# Patient Record
Sex: MALE | Race: BLACK OR AFRICAN AMERICAN | NOT HISPANIC OR LATINO | Employment: UNEMPLOYED | URBAN - METROPOLITAN AREA
[De-identification: names, ages, dates, MRNs, and addresses within clinical notes are randomized per-mention and may not be internally consistent; named-entity substitution may affect disease eponyms.]

---

## 2023-05-24 PROBLEM — R31.9 HEMATURIA: Status: ACTIVE | Noted: 2023-05-24

## 2023-05-24 PROBLEM — E55.9 VITAMIN D DEFICIENCY: Status: ACTIVE | Noted: 2023-05-24

## 2023-05-24 PROBLEM — H57.89 REDNESS OF EYE, LEFT: Status: ACTIVE | Noted: 2023-05-24

## 2023-05-24 PROBLEM — M54.16 ACUTE RADICULAR LOW BACK PAIN: Status: ACTIVE | Noted: 2023-05-24

## 2023-05-24 PROBLEM — E78.5 ELEVATED LIPIDS: Status: ACTIVE | Noted: 2023-05-24

## 2023-05-24 PROBLEM — R68.82 LIBIDO, DECREASED: Status: ACTIVE | Noted: 2023-05-24

## 2023-05-24 PROBLEM — R73.03 PREDIABETES: Status: ACTIVE | Noted: 2023-05-24

## 2023-05-24 PROBLEM — I10 HYPERTENSION: Status: ACTIVE | Noted: 2023-05-24

## 2023-05-24 PROBLEM — R53.83 FATIGUE: Status: ACTIVE | Noted: 2023-05-24

## 2023-05-24 PROBLEM — G47.33 OSA (OBSTRUCTIVE SLEEP APNEA): Status: ACTIVE | Noted: 2023-05-24

## 2023-05-24 RX ORDER — CYCLOBENZAPRINE HCL 10 MG
1 TABLET ORAL NIGHTLY
COMMUNITY
Start: 2022-09-09

## 2023-05-24 RX ORDER — AMLODIPINE BESYLATE 5 MG/1
1 TABLET ORAL DAILY
COMMUNITY
Start: 2020-01-09 | End: 2023-06-09 | Stop reason: ALTCHOICE

## 2023-05-24 RX ORDER — TOBRAMYCIN 3 MG/ML
SOLUTION/ DROPS OPHTHALMIC
COMMUNITY
Start: 2022-09-09 | End: 2023-06-09 | Stop reason: ALTCHOICE

## 2023-05-24 RX ORDER — CHOLECALCIFEROL (VITAMIN D3) 50 MCG
1 TABLET ORAL DAILY
COMMUNITY
Start: 2019-10-11 | End: 2023-06-09 | Stop reason: SDUPTHER

## 2023-05-24 RX ORDER — GABAPENTIN 100 MG/1
CAPSULE ORAL
COMMUNITY
Start: 2021-03-03 | End: 2023-06-09 | Stop reason: ALTCHOICE

## 2023-06-09 ENCOUNTER — OFFICE VISIT (OUTPATIENT)
Dept: PRIMARY CARE | Facility: CLINIC | Age: 43
End: 2023-06-09
Payer: COMMERCIAL

## 2023-06-09 VITALS
SYSTOLIC BLOOD PRESSURE: 128 MMHG | OXYGEN SATURATION: 98 % | HEART RATE: 57 BPM | RESPIRATION RATE: 16 BRPM | WEIGHT: 196 LBS | HEIGHT: 72 IN | DIASTOLIC BLOOD PRESSURE: 77 MMHG | TEMPERATURE: 96.4 F | BODY MASS INDEX: 26.55 KG/M2

## 2023-06-09 DIAGNOSIS — E78.5 ELEVATED LIPIDS: ICD-10-CM

## 2023-06-09 DIAGNOSIS — I10 HYPERTENSION, UNSPECIFIED TYPE: Primary | ICD-10-CM

## 2023-06-09 DIAGNOSIS — E55.9 VITAMIN D DEFICIENCY: ICD-10-CM

## 2023-06-09 DIAGNOSIS — R73.03 PREDIABETES: ICD-10-CM

## 2023-06-09 PROBLEM — R53.83 FATIGUE: Status: RESOLVED | Noted: 2023-05-24 | Resolved: 2023-06-09

## 2023-06-09 PROBLEM — R31.9 HEMATURIA: Status: RESOLVED | Noted: 2023-05-24 | Resolved: 2023-06-09

## 2023-06-09 PROBLEM — M54.16 ACUTE RADICULAR LOW BACK PAIN: Status: RESOLVED | Noted: 2023-05-24 | Resolved: 2023-06-09

## 2023-06-09 PROBLEM — H57.89 REDNESS OF EYE, LEFT: Status: RESOLVED | Noted: 2023-05-24 | Resolved: 2023-06-09

## 2023-06-09 PROCEDURE — 1036F TOBACCO NON-USER: CPT | Performed by: PEDIATRICS

## 2023-06-09 PROCEDURE — 3078F DIAST BP <80 MM HG: CPT | Performed by: PEDIATRICS

## 2023-06-09 PROCEDURE — 99213 OFFICE O/P EST LOW 20 MIN: CPT | Performed by: PEDIATRICS

## 2023-06-09 PROCEDURE — 3074F SYST BP LT 130 MM HG: CPT | Performed by: PEDIATRICS

## 2023-06-09 RX ORDER — CHOLECALCIFEROL (VITAMIN D3) 50 MCG
50 TABLET ORAL DAILY
Qty: 90 TABLET | Refills: 3 | Status: SHIPPED | OUTPATIENT
Start: 2023-06-09

## 2023-06-09 RX ORDER — IBUPROFEN 800 MG/1
TABLET ORAL
COMMUNITY
Start: 2023-05-10 | End: 2023-06-09 | Stop reason: ALTCHOICE

## 2023-06-09 RX ORDER — AMLODIPINE BESYLATE 10 MG/1
TABLET ORAL
COMMUNITY
Start: 2023-05-10

## 2023-06-09 ASSESSMENT — PATIENT HEALTH QUESTIONNAIRE - PHQ9
1. LITTLE INTEREST OR PLEASURE IN DOING THINGS: NOT AT ALL
SUM OF ALL RESPONSES TO PHQ9 QUESTIONS 1 AND 2: 0
2. FEELING DOWN, DEPRESSED OR HOPELESS: NOT AT ALL

## 2023-06-09 ASSESSMENT — PAIN SCALES - GENERAL: PAINLEVEL: 0-NO PAIN

## 2023-06-09 NOTE — PROGRESS NOTES
Subjective   Patient ID: Malik Francis is a 42 y.o. male who presents for Hypertension and Prediabetes.    HPI   Patient occasionally has flank pain with heavy lifting;  Last UA showed no blood;  Patient had normal PSA in December  Last tetanus shot was 2015.  Review of Systems    Objective   /77 (BP Location: Left arm, Patient Position: Sitting, BP Cuff Size: Large adult)   Pulse 57   Temp 35.8 °C (96.4 °F) (Temporal)   Resp 16   Ht 1.829 m (6')   Wt 88.9 kg (196 lb)   SpO2 98%   BMI 26.58 kg/m²     Physical Exam  HEENT neg  Lungs clear  Heart reg  Abd soft  SLR neg  Assessment/Plan   Problem List Items Addressed This Visit          Circulatory    Hypertension - Primary     Well controlled on Amlodipine 10            Endocrine/Metabolic    Prediabetes     Last sugar 89         Vitamin D deficiency    Relevant Medications    cholecalciferol (Vitamin D-3) 50 MCG (2000 UT) tablet       Other    Elevated lipids     Last cholesterol 195

## 2023-12-08 ENCOUNTER — APPOINTMENT (OUTPATIENT)
Dept: PRIMARY CARE | Facility: CLINIC | Age: 43
End: 2023-12-08
Payer: COMMERCIAL

## 2024-07-19 ENCOUNTER — HOSPITAL ENCOUNTER (OUTPATIENT)
Dept: RADIOLOGY | Facility: HOSPITAL | Age: 44
Discharge: HOME | End: 2024-07-19
Payer: COMMERCIAL

## 2024-07-19 ENCOUNTER — APPOINTMENT (OUTPATIENT)
Dept: PRIMARY CARE | Facility: CLINIC | Age: 44
End: 2024-07-19
Payer: COMMERCIAL

## 2024-07-19 VITALS
TEMPERATURE: 98 F | HEART RATE: 50 BPM | SYSTOLIC BLOOD PRESSURE: 136 MMHG | OXYGEN SATURATION: 98 % | HEIGHT: 72 IN | BODY MASS INDEX: 25.71 KG/M2 | WEIGHT: 189.8 LBS | DIASTOLIC BLOOD PRESSURE: 76 MMHG

## 2024-07-19 DIAGNOSIS — R10.32 LEFT LOWER QUADRANT ABDOMINAL PAIN: ICD-10-CM

## 2024-07-19 DIAGNOSIS — Z20.2 EXPOSURE TO STD: ICD-10-CM

## 2024-07-19 DIAGNOSIS — R82.90 CLOUDY URINE: ICD-10-CM

## 2024-07-19 DIAGNOSIS — R73.03 PREDIABETES: ICD-10-CM

## 2024-07-19 DIAGNOSIS — E78.5 ELEVATED LIPIDS: ICD-10-CM

## 2024-07-19 DIAGNOSIS — E55.9 VITAMIN D DEFICIENCY: ICD-10-CM

## 2024-07-19 DIAGNOSIS — I10 HYPERTENSION, UNSPECIFIED TYPE: Primary | ICD-10-CM

## 2024-07-19 DIAGNOSIS — Z12.11 COLON CANCER SCREENING: ICD-10-CM

## 2024-07-19 PROBLEM — G47.33 OSA (OBSTRUCTIVE SLEEP APNEA): Status: RESOLVED | Noted: 2023-05-24 | Resolved: 2024-07-19

## 2024-07-19 LAB
APPEARANCE UR: ABNORMAL
BILIRUB UR QL STRIP: NEGATIVE
COLOR UR: ABNORMAL
GLUCOSE UR STRIP-MCNC: NEGATIVE MG/DL
HCV AB SER QL: NONREACTIVE
HGB UR QL STRIP: NEGATIVE
HIV 1+2 AB+HIV1 P24 AG SERPL QL IA: NONREACTIVE
KETONES UR STRIP-MCNC: NEGATIVE MG/DL
LEUKOCYTE ESTERASE UR QL STRIP: NEGATIVE
NITRITE UR QL STRIP: NEGATIVE
PH UR STRIP: 7 [PH]
PROT UR STRIP-MCNC: NEGATIVE MG/DL
SP GR UR STRIP.AUTO: 1.02
TREPONEMA PALLIDUM IGG+IGM AB [PRESENCE] IN SERUM OR PLASMA BY IMMUNOASSAY: NONREACTIVE
UROBILINOGEN UR STRIP-ACNC: 1 E.U./DL

## 2024-07-19 PROCEDURE — 87389 HIV-1 AG W/HIV-1&-2 AB AG IA: CPT

## 2024-07-19 PROCEDURE — 3075F SYST BP GE 130 - 139MM HG: CPT | Performed by: PEDIATRICS

## 2024-07-19 PROCEDURE — 3008F BODY MASS INDEX DOCD: CPT | Performed by: PEDIATRICS

## 2024-07-19 PROCEDURE — 86780 TREPONEMA PALLIDUM: CPT

## 2024-07-19 PROCEDURE — 87591 N.GONORRHOEAE DNA AMP PROB: CPT

## 2024-07-19 PROCEDURE — 1036F TOBACCO NON-USER: CPT | Performed by: PEDIATRICS

## 2024-07-19 PROCEDURE — 36415 COLL VENOUS BLD VENIPUNCTURE: CPT

## 2024-07-19 PROCEDURE — 86803 HEPATITIS C AB TEST: CPT

## 2024-07-19 PROCEDURE — 3078F DIAST BP <80 MM HG: CPT | Performed by: PEDIATRICS

## 2024-07-19 PROCEDURE — 74176 CT ABD & PELVIS W/O CONTRAST: CPT

## 2024-07-19 PROCEDURE — 87491 CHLMYD TRACH DNA AMP PROBE: CPT

## 2024-07-19 RX ORDER — AMLODIPINE BESYLATE 5 MG/1
5 TABLET ORAL DAILY
Qty: 90 TABLET | Refills: 0 | Status: SHIPPED | OUTPATIENT
Start: 2024-07-19 | End: 2025-01-15

## 2024-07-19 ASSESSMENT — ENCOUNTER SYMPTOMS
DIZZINESS: 0
SHORTNESS OF BREATH: 0
ABDOMINAL PAIN: 1
CHILLS: 0
DYSURIA: 0
FEVER: 0
HEADACHES: 0

## 2024-07-19 NOTE — PROGRESS NOTES
Subjective   Patient ID: Malik Francis is a 44 y.o. male who presents for yearly visit    HPI   Here today for annual physical.  Stopped taking amlodipine 10mg in months due to running out.     Has cramping/sharp abdominal pain in the LLQ that radiates down to groin area. States pain has been going on for years but has worsened over the past couple of months.  The pain comes and goes and when it is painful he rates the pain 10/10. Does not take anything for the pain.  Has a hernia repair in the same area in 2010 which is thinks could be related. Not constipated; No bulge in groin.    Diet: Eating out frequently and eating more processed foods. Likes vegetables, drinks a lot of water.    Alcohol: was previously drinking daily, 1/2 pint of liquor daily, has not had a drink in 3 weeks   Smokes marijuana daily   No illicit drug use     Colonoscopy - never done,PSA: last year - normal     Due for annual labs   Colonoscopy order   Tdap 2016    Review of Systems   Constitutional:  Negative for chills and fever.   Respiratory:  Negative for shortness of breath.    Cardiovascular:  Negative for chest pain.   Gastrointestinal:  Positive for abdominal pain.   Genitourinary:  Negative for dysuria.   Neurological:  Negative for dizziness and headaches.     Back hurts every day; sometimes heavy lifting at work; lifts from the legs  Objective   /76   Pulse 50   Temp 36.7 °C (98 °F)   Ht 1.829 m (6')   Wt 86.1 kg (189 lb 12.8 oz)   SpO2 98%   BMI 25.74 kg/m²     Physical Exam  Vitals reviewed.   Constitutional:       General: He is not in acute distress.  HENT:      Head: Normocephalic.      Right Ear: Tympanic membrane normal.      Left Ear: Tympanic membrane normal.      Nose: Nose normal.      Mouth/Throat:      Pharynx: Oropharynx is clear.   Cardiovascular:      Rate and Rhythm: Normal rate and regular rhythm.      Heart sounds: Normal heart sounds.   Pulmonary:      Breath sounds: Normal breath sounds.    Abdominal:      Palpations: Abdomen is soft.      Tenderness: There is abdominal tenderness.      Comments: LLQ tender with guarding   Genitourinary:     Comments: No hernia  Musculoskeletal:         General: No tenderness.   Skin:     Findings: No rash.   Neurological:      General: No focal deficit present.      Mental Status: He is alert.   Psychiatric:         Mood and Affect: Mood normal.         Assessment/Plan   Problem List Items Addressed This Visit             ICD-10-CM    Elevated lipids E78.5      in 2021  Will recheck today         Relevant Orders    Comprehensive Metabolic Panel    Lipid Panel    Hypertension - Primary I10     Has not been on bp medication, amplodipine for months due to running out.   Does not take bp at home         Relevant Medications    amLODIPine (Norvasc) 5 mg tablet    Prediabetes R73.03    Relevant Orders    Hemoglobin A1C    Vitamin D deficiency E55.9     Does not take vitamin D supplements   Will recheck today         Left lower quadrant abdominal pain R10.32    Relevant Orders    CT abdomen pelvis w and wo IV contrast     Other Visit Diagnoses         Codes    Colon cancer screening     Z12.11    Relevant Orders    Cologuard® colon cancer screening    Exposure to STD     Z20.2    Relevant Orders    HIV 1/2 Antigen/Antibody Screen with Reflex to Confirmation    Hepatitis C antibody    Syphilis Screen with Reflex    C. trachomatis + N. gonorrhoeae, Amplified

## 2024-07-19 NOTE — ASSESSMENT & PLAN NOTE
Has not been on bp medication, amplodipine for months due to running out.   Does not take bp at home

## 2024-07-20 LAB
C TRACH RRNA SPEC QL NAA+PROBE: NEGATIVE
N GONORRHOEA DNA SPEC QL PROBE+SIG AMP: NEGATIVE

## 2024-07-21 PROBLEM — H02.88A MEIBOMIAN GLAND DYSFUNCTION (MGD) OF UPPER AND LOWER EYELID OF RIGHT EYE: Status: ACTIVE | Noted: 2022-11-17

## 2024-07-23 DIAGNOSIS — R10.32 LEFT LOWER QUADRANT ABDOMINAL PAIN: Primary | ICD-10-CM

## 2024-07-23 DIAGNOSIS — Z12.5 SCREENING PSA (PROSTATE SPECIFIC ANTIGEN): ICD-10-CM

## 2024-07-24 ENCOUNTER — TELEPHONE (OUTPATIENT)
Dept: GASTROENTEROLOGY | Facility: CLINIC | Age: 44
End: 2024-07-24
Payer: COMMERCIAL

## 2024-07-24 NOTE — TELEPHONE ENCOUNTER
Good morning, I just called the pt and left him a message. The soonest that we have available is 9/17. Thank you!!          Copied from CRM #[id number]. Topic: Needs Earlier Appointment  >> Jul 24, 2024 10:50 AM Chloé CHICAS wrote:  [employer] Message created and sent to department pool. has concerns about  and she is asking that he is seen by Wu Hernandez I was not able to offer him anything sooner than Oct asking for assist for a care and treatment plan for his Left lower quadrant abdominal pain [R10.32]

## 2024-10-30 ENCOUNTER — APPOINTMENT (OUTPATIENT)
Dept: GASTROENTEROLOGY | Facility: CLINIC | Age: 44
End: 2024-10-30
Payer: COMMERCIAL

## 2025-02-03 ENCOUNTER — PATIENT OUTREACH (OUTPATIENT)
Dept: CARE COORDINATION | Facility: CLINIC | Age: 45
End: 2025-02-03
Payer: COMMERCIAL

## 2025-02-03 NOTE — PROGRESS NOTES
Outreach call to patient to support a smooth transition of care from recent admission.  Spoke with patient, reviewed discharge medications, discharge instructions, assessed social needs, and provided education on importance of follow-up appointment with provider.  Will continue to monitor through transition period.Patient was discharged from [facility] . Offered to make post hosptila follow up with Kathy Thornton MD patient wanted to make himself. Patient has all his medications at home and understands plan of care.       Assessment & Plan  ANA PAULA (acute kidney injury) (HCC)  Neph consult   Avoid nephrotoxic meds   Abdominal pain  Ct abd unremarkable   Essential hypertension  Add Norvasc         Thank you,   Yusra North , RN   Nurse Care Manager   [facility] Department   [phone]

## 2025-02-18 ENCOUNTER — PATIENT OUTREACH (OUTPATIENT)
Dept: CARE COORDINATION | Facility: CLINIC | Age: 45
End: 2025-02-18
Payer: COMMERCIAL

## 2025-02-18 NOTE — PROGRESS NOTES
Outreach call to patient to support a smooth transition of care from recent admission.  Patient has not followed up with PCP yet.     Yusra North , RN   Nurse Care Manager   [facility] Department   [phone]

## 2025-03-04 ENCOUNTER — PATIENT OUTREACH (OUTPATIENT)
Dept: CARE COORDINATION | Facility: CLINIC | Age: 45
End: 2025-03-04
Payer: COMMERCIAL

## 2025-03-04 NOTE — PROGRESS NOTES
Outreach call to patient to check in 30 days after hospital discharge to support smooth transition of care.  Patient with no additional needs noted. No additional outreach needed at this time. Reminded him to follow up with Kathy Thornton MD     Thank you,   Yusra North , RN   Nurse Care Manager   [facility]   [phone]

## 2025-05-09 ENCOUNTER — OFFICE VISIT (OUTPATIENT)
Dept: URGENT CARE | Age: 45
End: 2025-05-09
Payer: COMMERCIAL

## 2025-05-09 VITALS
BODY MASS INDEX: 25.06 KG/M2 | HEIGHT: 72 IN | RESPIRATION RATE: 18 BRPM | TEMPERATURE: 98 F | SYSTOLIC BLOOD PRESSURE: 156 MMHG | DIASTOLIC BLOOD PRESSURE: 99 MMHG | OXYGEN SATURATION: 97 % | HEART RATE: 61 BPM | WEIGHT: 185 LBS

## 2025-05-09 DIAGNOSIS — Z02.0 SCHOOL PHYSICAL EXAM: ICD-10-CM

## 2025-05-09 DIAGNOSIS — I10 PRIMARY HYPERTENSION: Primary | ICD-10-CM

## 2025-05-09 PROCEDURE — 1036F TOBACCO NON-USER: CPT | Performed by: PHYSICIAN ASSISTANT

## 2025-05-09 PROCEDURE — 3077F SYST BP >= 140 MM HG: CPT | Performed by: PHYSICIAN ASSISTANT

## 2025-05-09 PROCEDURE — 3080F DIAST BP >= 90 MM HG: CPT | Performed by: PHYSICIAN ASSISTANT

## 2025-05-09 PROCEDURE — 3008F BODY MASS INDEX DOCD: CPT | Performed by: PHYSICIAN ASSISTANT

## 2025-05-09 PROCEDURE — INPHY INTER PHYSICAL: Performed by: PHYSICIAN ASSISTANT

## 2025-05-09 RX ORDER — AMLODIPINE BESYLATE 5 MG/1
5 TABLET ORAL DAILY
Qty: 30 TABLET | Refills: 0 | Status: SHIPPED | OUTPATIENT
Start: 2025-05-09 | End: 2026-05-09

## 2025-07-07 ENCOUNTER — HOSPITAL ENCOUNTER (EMERGENCY)
Facility: HOSPITAL | Age: 45
Discharge: HOME | End: 2025-07-07
Attending: EMERGENCY MEDICINE
Payer: COMMERCIAL

## 2025-07-07 VITALS
OXYGEN SATURATION: 95 % | DIASTOLIC BLOOD PRESSURE: 92 MMHG | SYSTOLIC BLOOD PRESSURE: 153 MMHG | HEART RATE: 87 BPM | TEMPERATURE: 98.5 F | RESPIRATION RATE: 16 BRPM

## 2025-07-07 DIAGNOSIS — K40.90 UNILATERAL INGUINAL HERNIA WITHOUT OBSTRUCTION OR GANGRENE, RECURRENCE NOT SPECIFIED: ICD-10-CM

## 2025-07-07 DIAGNOSIS — N41.0 ACUTE PROSTATITIS: Primary | ICD-10-CM

## 2025-07-07 LAB — HIV 1+2 AB+HIV1 P24 AG SERPL QL IA: NONREACTIVE

## 2025-07-07 PROCEDURE — 87389 HIV-1 AG W/HIV-1&-2 AB AG IA: CPT | Performed by: EMERGENCY MEDICINE

## 2025-07-07 PROCEDURE — 36415 COLL VENOUS BLD VENIPUNCTURE: CPT

## 2025-07-07 PROCEDURE — 87491 CHLMYD TRACH DNA AMP PROBE: CPT

## 2025-07-07 PROCEDURE — 87340 HEPATITIS B SURFACE AG IA: CPT | Performed by: EMERGENCY MEDICINE

## 2025-07-07 PROCEDURE — 96372 THER/PROPH/DIAG INJ SC/IM: CPT

## 2025-07-07 PROCEDURE — 2500000001 HC RX 250 WO HCPCS SELF ADMINISTERED DRUGS (ALT 637 FOR MEDICARE OP)

## 2025-07-07 PROCEDURE — 36415 COLL VENOUS BLD VENIPUNCTURE: CPT | Performed by: EMERGENCY MEDICINE

## 2025-07-07 PROCEDURE — 99284 EMERGENCY DEPT VISIT MOD MDM: CPT | Performed by: EMERGENCY MEDICINE

## 2025-07-07 PROCEDURE — 86780 TREPONEMA PALLIDUM: CPT | Performed by: EMERGENCY MEDICINE

## 2025-07-07 PROCEDURE — 2500000004 HC RX 250 GENERAL PHARMACY W/ HCPCS (ALT 636 FOR OP/ED)

## 2025-07-07 RX ORDER — IBUPROFEN 600 MG/1
600 TABLET, FILM COATED ORAL EVERY 6 HOURS PRN
Qty: 28 TABLET | Refills: 0 | Status: SHIPPED | OUTPATIENT
Start: 2025-07-07 | End: 2025-07-14

## 2025-07-07 RX ORDER — DOXYCYCLINE 100 MG/1
100 TABLET ORAL 2 TIMES DAILY
Qty: 28 TABLET | Refills: 0 | Status: SHIPPED | OUTPATIENT
Start: 2025-07-07 | End: 2025-07-21

## 2025-07-07 RX ORDER — ACETAMINOPHEN 325 MG/1
975 TABLET ORAL ONCE
Status: COMPLETED | OUTPATIENT
Start: 2025-07-07 | End: 2025-07-07

## 2025-07-07 RX ORDER — ACETAMINOPHEN 325 MG/1
650 TABLET ORAL EVERY 6 HOURS PRN
Qty: 30 TABLET | Refills: 0 | Status: SHIPPED | OUTPATIENT
Start: 2025-07-07

## 2025-07-07 RX ORDER — TAMSULOSIN HYDROCHLORIDE 0.4 MG/1
0.4 CAPSULE ORAL DAILY
Qty: 5 CAPSULE | Refills: 0 | Status: SHIPPED | OUTPATIENT
Start: 2025-07-07

## 2025-07-07 RX ORDER — DOXYCYCLINE HYCLATE 100 MG
100 TABLET ORAL ONCE
Status: COMPLETED | OUTPATIENT
Start: 2025-07-07 | End: 2025-07-07

## 2025-07-07 RX ORDER — CEFTRIAXONE 1 G/1
1 INJECTION, POWDER, FOR SOLUTION INTRAMUSCULAR; INTRAVENOUS ONCE
Status: COMPLETED | OUTPATIENT
Start: 2025-07-07 | End: 2025-07-07

## 2025-07-07 RX ORDER — CEFTRIAXONE 1 G/50ML
1 INJECTION, SOLUTION INTRAVENOUS ONCE
Status: DISCONTINUED | OUTPATIENT
Start: 2025-07-07 | End: 2025-07-07

## 2025-07-07 RX ADMIN — ACETAMINOPHEN 975 MG: 325 TABLET ORAL at 21:20

## 2025-07-07 RX ADMIN — CEFTRIAXONE SODIUM 1 G: 1 INJECTION, POWDER, FOR SOLUTION INTRAMUSCULAR; INTRAVENOUS at 21:20

## 2025-07-07 RX ADMIN — DOXYCYCLINE HYCLATE 100 MG: 100 TABLET, COATED ORAL at 21:20

## 2025-07-07 ASSESSMENT — PAIN - FUNCTIONAL ASSESSMENT: PAIN_FUNCTIONAL_ASSESSMENT: 0-10

## 2025-07-07 ASSESSMENT — PAIN SCALES - GENERAL: PAINLEVEL_OUTOF10: 6

## 2025-07-07 NOTE — Clinical Note
Malik Francis was seen and treated in our emergency department on 7/7/2025.  He may return to work on 07/10/2025.       If you have any questions or concerns, please don't hesitate to call.      Samir Mclaughlin MD

## 2025-07-07 NOTE — ED TRIAGE NOTES
R pelvic/testicle swelling. States it has been going on for about 4 weeks. Worsens with straining. Hx of hernia repair on the L side  
Home

## 2025-07-08 LAB
C TRACH RRNA SPEC QL NAA+PROBE: NEGATIVE
HBV SURFACE AG SERPL QL IA: NONREACTIVE
N GONORRHOEA DNA SPEC QL PROBE+SIG AMP: NEGATIVE
TREPONEMA PALLIDUM IGG+IGM AB [PRESENCE] IN SERUM OR PLASMA BY IMMUNOASSAY: NONREACTIVE

## 2025-07-08 NOTE — DISCHARGE INSTRUCTIONS
You have been evaluated in the Emergency Department today for groin pain and swelling. Your evaluation, including thorough exam and, suggests that your symptoms are due to prostatitis.  We have sent prescriptions for antibiotics which you will take for 14 days.  Please take full full course even if you feel better.  We have also given you prescription for Tylenol and ibuprofen as well as Flomax to help your urinary stream.    Please follow up with your primary care physician within two days. If you do not have a primary care doctor you may call [phone] to make an appointment.    Return to the Emergency Department if you experience lightheadedness, worsening pain or symptoms. Return to the Emergency Department if you develop any new or worsening symptoms.   Thank you for choosing us for your care.

## 2025-07-08 NOTE — ED PROVIDER NOTES
History of Present Illness     History provided by: Patient  Limitations to History: None  External Records Reviewed: Discharge summaries    HPI:  Malik Francis is a 45 y.o. male with past medical history pertinent for hypertension, bradycardia, repaired left inguinal hernia presents to the emergency department for evaluation in the setting of groin swelling.  Patient states right groin and testicular swelling has been going on for about 4 weeks that worsens with straining.  Patient states urinary hesitancy and frequency over the same time period.  Patient states unprotected vaginal intercourse.  Patient denies fevers or chills.  Patient states back pain.  Patient denies nausea/vomiting.  Patient denies hematochezia or melena.  Patient denies obstipation or constipation.    Physical Exam   Triage vitals:  T 36.9 °C (98.5 °F)  HR 87  BP (!) 153/92  RR 16  O2 95 % None (Room air)    Patient is awake, alert, oriented, no acute distress resting comfortably on hospital chair  He speaks in full sentences with no respiratory distress  He has 2+ radial pulses bilaterally with no delayed capillary refill, his pulses strong and regular  He moves all 4 extremities without deficit or difficulty, he ambulates without difficulty  Patient's abdominal exam is reassuring with no guarding, rebound, peritonitic signs  He has no CVA tenderness, he does have suprapubic tenderness, he has a left inguinal scar without obvious hernia, no swelling, no inguinal lymphadenopathy, no right inguinal swelling, his scrotum is not erythematous, there are no penile or scrotal lesions, there is no fluctuance or masses appreciated in his scrotum, his testes are present bilaterally in his scrotum without pain on manipulation  A chaperoned rectal exam reveals exquisite prostate tenderness without nodularity, no blood on DAYANARA    Medical Decision Making & ED Course   Medical Decision Makin y.o. male hypertensive with a history of same otherwise  hemodynamically stable presents to the emergency department for groin swelling.  Patient's presentation is consistent with an episode of bacterial prostatitis.  Patient will be treated empirically with Rocephin and doxycycline as well as a prescription for the same.  He was given Flomax and Tylenol for pain control.  No concerns about hernia in this patient.  Patient's presentation is not consistent with intermittent torsion and detorsion.  No concerns about serious bacterial illness causing systemic symptoms.  Will obtain urine and STI testing.  Patient was discharged with results pending.  Patient was given referral to primary care.  ----         Social Determinants of Health which Significantly Impact Care: None identified       Chronic conditions affecting the patient's care: See HPI    The patient was discussed with the following consultants/services: Please see ED course for consult transcript        ED Course:  Diagnoses as of 07/07/25 2050   Acute prostatitis     Disposition   As a result of the work-up, the patient was discharged home.  he was informed of his diagnosis and instructed to come back with any concerns or worsening of condition.  he and was agreeable to the plan as discussed above.  he was given the opportunity to ask questions.  All of the patient's questions were answered.    Procedures   Procedures    Patient was seen and discussed with the attending of record.    Samir Mclaughlin MD  Emergency Medicine     Samir Mclaughlin MD  Resident  07/07/25 2053       Samir Mclaughlin MD  Resident  07/07/25 2057

## 2025-07-23 ENCOUNTER — APPOINTMENT (OUTPATIENT)
Dept: PRIMARY CARE | Facility: CLINIC | Age: 45
End: 2025-07-23
Payer: COMMERCIAL

## 2025-07-23 ENCOUNTER — RESULTS FOLLOW-UP (OUTPATIENT)
Dept: PRIMARY CARE | Facility: CLINIC | Age: 45
End: 2025-07-23

## 2025-07-23 VITALS
HEIGHT: 72 IN | OXYGEN SATURATION: 97 % | DIASTOLIC BLOOD PRESSURE: 89 MMHG | WEIGHT: 180 LBS | TEMPERATURE: 98.1 F | RESPIRATION RATE: 16 BRPM | SYSTOLIC BLOOD PRESSURE: 139 MMHG | HEART RATE: 52 BPM | BODY MASS INDEX: 24.38 KG/M2

## 2025-07-23 DIAGNOSIS — K21.9 GASTROESOPHAGEAL REFLUX DISEASE, UNSPECIFIED WHETHER ESOPHAGITIS PRESENT: ICD-10-CM

## 2025-07-23 DIAGNOSIS — I10 HYPERTENSION, UNSPECIFIED TYPE: ICD-10-CM

## 2025-07-23 DIAGNOSIS — Z12.5 SCREENING PSA (PROSTATE SPECIFIC ANTIGEN): ICD-10-CM

## 2025-07-23 DIAGNOSIS — E55.9 VITAMIN D DEFICIENCY: ICD-10-CM

## 2025-07-23 DIAGNOSIS — E78.5 ELEVATED LIPIDS: ICD-10-CM

## 2025-07-23 DIAGNOSIS — R73.03 PREDIABETES: ICD-10-CM

## 2025-07-23 DIAGNOSIS — K40.90 RIGHT INGUINAL HERNIA: Primary | ICD-10-CM

## 2025-07-23 LAB
25(OH)D3 SERPL-MCNC: 22 NG/ML (ref 30–100)
ANION GAP SERPL CALC-SCNC: 11 MMOL/L (ref 10–20)
BUN SERPL-MCNC: 9 MG/DL (ref 7–18)
CALCIUM SERPL-MCNC: 9.2 MG/DL (ref 8.5–10.1)
CHLORIDE SERPL-SCNC: 99 MMOL/L (ref 98–107)
CHOLEST SERPL-MCNC: 173 MG/DL (ref 0–199)
CHOLESTEROL/HDL RATIO: 3.1 (ref 4.2–7)
CO2 SERPL-SCNC: 28 MMOL/L (ref 21–32)
CREAT SERPL-MCNC: 0.66 MG/DL (ref 0.6–1.1)
EGFRCR SERPLBLD CKD-EPI 2021: >90 ML/MIN/1.73M*2
GLUCOSE SERPL-MCNC: 94 MG/DL (ref 74–100)
HBA1C MFR BLD: 5.7 %
HDLC SERPL-MCNC: 56 MG/DL (ref 40–59)
IS PATIENT FASTING: YES
LDLC SERPL DIRECT ASSAY-MCNC: 95 MG/DL (ref 0–100)
POTASSIUM SERPL-SCNC: 4.1 MMOL/L (ref 3.5–5.1)
PSA SERPL-MCNC: 0.63 NG/ML
SODIUM SERPL-SCNC: 134 MMOL/L (ref 136–145)
TRIGL SERPL-MCNC: 76 MG/DL

## 2025-07-23 PROCEDURE — 80048 BASIC METABOLIC PNL TOTAL CA: CPT | Performed by: PEDIATRICS

## 2025-07-23 PROCEDURE — 3079F DIAST BP 80-89 MM HG: CPT | Performed by: PEDIATRICS

## 2025-07-23 PROCEDURE — 1036F TOBACCO NON-USER: CPT | Performed by: PEDIATRICS

## 2025-07-23 PROCEDURE — 83036 HEMOGLOBIN GLYCOSYLATED A1C: CPT | Performed by: PEDIATRICS

## 2025-07-23 PROCEDURE — 99214 OFFICE O/P EST MOD 30 MIN: CPT | Performed by: PEDIATRICS

## 2025-07-23 PROCEDURE — 3008F BODY MASS INDEX DOCD: CPT | Performed by: PEDIATRICS

## 2025-07-23 PROCEDURE — 3075F SYST BP GE 130 - 139MM HG: CPT | Performed by: PEDIATRICS

## 2025-07-23 RX ORDER — AMLODIPINE BESYLATE 10 MG/1
10 TABLET ORAL DAILY
Qty: 30 TABLET | Refills: 5 | Status: SHIPPED | OUTPATIENT
Start: 2025-07-23 | End: 2026-01-19

## 2025-07-23 RX ORDER — PANTOPRAZOLE SODIUM 40 MG/1
40 TABLET, DELAYED RELEASE ORAL DAILY
Qty: 30 TABLET | Refills: 1 | Status: SHIPPED | OUTPATIENT
Start: 2025-07-23 | End: 2025-09-21

## 2025-07-23 ASSESSMENT — PAIN SCALES - GENERAL: PAINLEVEL_OUTOF10: 3

## 2025-07-23 NOTE — PROGRESS NOTES
Subjective   Patient ID: Malik Francis is a 45 y.o. male who presents for Hospital Discharge Follow.    HPI   Patient presented to ER on 7/7 with pain in low back radiating to lower abdomen along with urinary frequency and urinary hesitancy. Was diagnosed with prostatitis as prostate was tender on exam. He is nearly finished with the antibiotics. Symptoms are better. Also, 6 weeks ago he developed right inguinal hernia which is painful until he lays down. ER did not note hernia as he was laying down.  Smoking  marijuana  No alcohol  Review of Systems    Objective   /89 (BP Location: Right arm, Patient Position: Sitting, BP Cuff Size: Large adult)   Pulse 52   Temp 36.7 °C (98.1 °F) (Temporal)   Resp 16   Ht 1.829 m (6')   Wt 81.6 kg (180 lb)   SpO2 97%   BMI 24.41 kg/m²     Physical Exam  Bulge above right groin is slightly tender to palpation  Assessment/Plan     Problem List Items Addressed This Visit       Elevated lipids    Relevant Orders    Lipid Panel (Completed)    Hypertension    Relevant Medications    amLODIPine (Norvasc) 10 mg tablet    Other Relevant Orders    Basic Metabolic Panel (Completed)    Prediabetes    Relevant Orders    Hemoglobin A1C (Completed)    Vitamin D deficiency    Relevant Orders    Vitamin D 25-Hydroxy,Total (for eval of Vitamin D levels) (Completed)    Gastroesophageal reflux disease    Current Assessment & Plan   Recurred over the last 10 days; prilosec not helping         Relevant Medications    pantoprazole (ProtoNix) 40 mg EC tablet    amLODIPine (Norvasc) 10 mg tablet     Other Visit Diagnoses         Right inguinal hernia    -  Primary    Relevant Orders    Referral to General Surgery      Screening PSA (prostate specific antigen)        Relevant Orders    Prostate Specific Antigen (Completed)

## 2025-07-23 NOTE — LETTER
July 23, 2025     Patient: Malik Francis   YOB: 1980   Date of Visit: 7/23/2025       To Whom It May Concern:    Malik Francis was seen in my clinic on 7/23/2025 at 9:45 am. Please excuse Malik for his absence from work on this day to make the appointment.    If you have any questions or concerns, please don't hesitate to call.         Sincerely,         Kathy Thornton MD        CC: No Recipients

## 2025-07-24 NOTE — TELEPHONE ENCOUNTER
----- Message from Kathy Thornton sent at 7/23/2025  9:45 PM EDT -----  Sugar prediabetic-->please send diabetic diet;  Vitamin D is low-->start 1000 units daily  ----- Message -----  From: Lab, Background User  Sent: 7/23/2025  12:49 PM EDT  To: Kathy Thornton MD

## 2025-07-25 ENCOUNTER — APPOINTMENT (OUTPATIENT)
Dept: SURGERY | Facility: CLINIC | Age: 45
End: 2025-07-25
Payer: COMMERCIAL

## 2025-07-25 VITALS
SYSTOLIC BLOOD PRESSURE: 132 MMHG | WEIGHT: 180 LBS | BODY MASS INDEX: 24.41 KG/M2 | RESPIRATION RATE: 18 BRPM | DIASTOLIC BLOOD PRESSURE: 83 MMHG | HEART RATE: 68 BPM

## 2025-07-25 DIAGNOSIS — K40.90 NON-RECURRENT UNILATERAL INGUINAL HERNIA WITHOUT OBSTRUCTION OR GANGRENE: Primary | ICD-10-CM

## 2025-07-25 PROCEDURE — 99203 OFFICE O/P NEW LOW 30 MIN: CPT | Performed by: NURSE PRACTITIONER

## 2025-07-25 PROCEDURE — 3079F DIAST BP 80-89 MM HG: CPT | Performed by: NURSE PRACTITIONER

## 2025-07-25 PROCEDURE — 3075F SYST BP GE 130 - 139MM HG: CPT | Performed by: NURSE PRACTITIONER

## 2025-07-25 ASSESSMENT — PAIN SCALES - GENERAL: PAINLEVEL_OUTOF10: 4

## 2025-07-25 NOTE — PROGRESS NOTES
History Of Present Illness  Malik Francis is a 45 y.o. male presenting with inguinal hernia referred by PCP, Kathy Thornton MD.   Recently in ED on 7/7 for c/o R groin swelling, he was diagnosed with acute prostatitis and discharged on doxycycline and flomax. Seen by PCP on 7/23 and referred to general surgery for evaluation of a R inguinal hernia.   He reports R inguinal hernia developed 6 weeks ago with a bulge in the groin with associated pain. He is able to manually reduce hernia at times. He has been wearing a binder while working to help compress the hernia. He reports urinary symptoms have resolved since starting flomax. Denies any bowel complaints. Past surgical history notable for a prior L open inguinal hernia repair in 2008.      Past Medical History  - HTN  - elevated lipids  - pre-DM   - Vit D deficiency   - GERD     Surgical History  - Open L inguinal hernia repair (2008)   - ACLS repair      Social History  - occasional alcohol use   - + tobacco use (cigars)   - denies illicit drug use     Family History  Family History[1]     Allergies  Patient has no known allergies.    Review of Systems  Constitutional: Denies fever, chills   HEENT: Denies changes in vision or hearing   Respiratory: Denies shortness of breath  Cardiovascular: Denies chest pain, palpitations   GI: Denies nausea, vomiting, constipation   : Denies urinary retention   MSK: Denies myalgia   Neurological: Denies headache or syncope   Skin: Denies rashes    Physical Exam  Constitutional:       General: He is not in acute distress.    Cardiovascular:      Rate and Rhythm: Normal rate and regular rhythm.   Pulmonary:      Effort: Pulmonary effort is normal. No respiratory distress.   Abdominal:      General: There is no distension.      Palpations: Abdomen is soft.      Tenderness: There is no abdominal tenderness.      Hernia: A hernia is present.      Comments: R inguinal hernia, no surrounding skin changes   Well healed scar L groin  from previous repair      Skin:     General: Skin is warm and dry.     Neurological:      Mental Status: He is alert and oriented to person, place, and time.            Last Recorded Vitals  Blood pressure 132/83, pulse 68, resp. rate 18, weight 81.6 kg (180 lb).    Assessment/Plan     45 year old male presents today for evaluation of a symptomatic R sided inguinal hernia which has been present for the past 6 weeks. Past surgical history of a prior L open inguinal hernia repair in 2008. He is interested in pursuing elective repair. Surgical risks including but not limited to bleeding, infection, injury to surrounding organs as well as risks of anesthesia were discussed and he wishes to proceed. Plan to schedule for elective robot-assisted R inguinal hernia repair with mesh with Dr Shearer in the upcoming future without the need for PAT.     Discussed with Dr Shearer.       Shanta Alves, APRN-CNP         [1] No family history on file.

## 2025-07-28 ENCOUNTER — APPOINTMENT (OUTPATIENT)
Dept: SURGERY | Facility: CLINIC | Age: 45
End: 2025-07-28
Payer: COMMERCIAL

## 2025-07-31 ENCOUNTER — OFFICE VISIT (OUTPATIENT)
Dept: GASTROENTEROLOGY | Facility: HOSPITAL | Age: 45
End: 2025-07-31
Payer: COMMERCIAL

## 2025-07-31 VITALS
DIASTOLIC BLOOD PRESSURE: 74 MMHG | HEART RATE: 64 BPM | OXYGEN SATURATION: 97 % | WEIGHT: 181 LBS | SYSTOLIC BLOOD PRESSURE: 116 MMHG | TEMPERATURE: 97.9 F | BODY MASS INDEX: 24.55 KG/M2

## 2025-07-31 DIAGNOSIS — Z12.11 SCREENING FOR COLON CANCER: ICD-10-CM

## 2025-07-31 DIAGNOSIS — R13.19 ESOPHAGEAL DYSPHAGIA: Primary | ICD-10-CM

## 2025-07-31 PROCEDURE — 99212 OFFICE O/P EST SF 10 MIN: CPT

## 2025-07-31 SDOH — ECONOMIC STABILITY: FOOD INSECURITY: WITHIN THE PAST 12 MONTHS, THE FOOD YOU BOUGHT JUST DIDN'T LAST AND YOU DIDN'T HAVE MONEY TO GET MORE.: NEVER TRUE

## 2025-07-31 SDOH — ECONOMIC STABILITY: FOOD INSECURITY: WITHIN THE PAST 12 MONTHS, YOU WORRIED THAT YOUR FOOD WOULD RUN OUT BEFORE YOU GOT MONEY TO BUY MORE.: NEVER TRUE

## 2025-07-31 ASSESSMENT — ENCOUNTER SYMPTOMS
VOMITING: 0
UNEXPECTED WEIGHT CHANGE: 0
BLOOD IN STOOL: 0
NAUSEA: 0
CONSTIPATION: 0
TROUBLE SWALLOWING: 1
ABDOMINAL PAIN: 0

## 2025-07-31 ASSESSMENT — PAIN SCALES - GENERAL: PAINLEVEL_OUTOF10: 0-NO PAIN

## 2025-07-31 ASSESSMENT — LIFESTYLE VARIABLES
AUDIT-C TOTAL SCORE: 1
HOW MANY STANDARD DRINKS CONTAINING ALCOHOL DO YOU HAVE ON A TYPICAL DAY: 1 OR 2
HOW OFTEN DO YOU HAVE SIX OR MORE DRINKS ON ONE OCCASION: NEVER
HOW OFTEN DO YOU HAVE A DRINK CONTAINING ALCOHOL: MONTHLY OR LESS
SKIP TO QUESTIONS 9-10: 1

## 2025-07-31 ASSESSMENT — PATIENT HEALTH QUESTIONNAIRE - PHQ9
SUM OF ALL RESPONSES TO PHQ9 QUESTIONS 1 & 2: 0
1. LITTLE INTEREST OR PLEASURE IN DOING THINGS: NOT AT ALL
2. FEELING DOWN, DEPRESSED OR HOPELESS: NOT AT ALL

## 2025-07-31 NOTE — PROGRESS NOTES
Subjective     History of Present Illness:   Malik Francis is a 45 y.o. male who presents to GI clinic for abdominal pain/GERD. PMH significant for HTN/DLD, preDM, GERD, R inguinal hernia.     In clinic today, pt reports significant acid reflux ongoing for several years. For the same amount of time also feel like food is getting stuck, only with solids. Took OTC prilosec PRN but did not feel like this helped. Was prescribed pantoprazole by his PCP but has not started this yet. Denies any chest or abdominal pain. Denies nausea, vomiting, melena.     Diet consists of well-balanced food, baked chicken, eggs, fruit, occasional fast food. Does endorse taking ibuprofen almost daily for back pain and inguinal hernia pain.    Social hx:   EtOH twice a month   Smokes marijuna a couple times a week.   Quit smoking in 2008, smoked for about 20 years     Family Hx:   No CRC or other GI cancers.     Review of Systems  Review of Systems   Constitutional:  Negative for unexpected weight change.   HENT:  Positive for trouble swallowing.    Cardiovascular:  Negative for chest pain.   Gastrointestinal:  Negative for abdominal pain, blood in stool, constipation, nausea and vomiting.   Genitourinary:  Negative for urgency.   Skin:  Negative for rash.       Past Medical History   has a past medical history of Other specified disorders of eye and adnexa (09/09/2022), Other symptoms and signs involving the nervous system (11/19/2019), Prediabetes (09/09/2022), Prepatellar bursitis, left knee (06/10/2018), Prepatellar bursitis, right knee (06/10/2018), and Radiculopathy, lumbar region (12/09/2022).     Social History   reports that he has never smoked. He has never used smokeless tobacco. He reports that he does not currently use drugs after having used the following drugs: Marijuana. He reports that he does not drink alcohol.     Family History  Family History[1]     Allergies  RX Allergies[2]    Medications  Current Outpatient  Medications   Medication Instructions    acetaminophen (TYLENOL) 650 mg, oral, Every 6 hours PRN    amLODIPine (NORVASC) 10 mg, oral, Daily    pantoprazole (PROTONIX) 40 mg, oral, Daily, Do not crush, chew, or split.    tamsulosin (FLOMAX) 0.4 mg, oral, Daily, Do not crush, chew, or split.        Objective     Physical Exam  Constitutional:       General: He is not in acute distress.  HENT:      Head: Normocephalic and atraumatic.      Mouth/Throat:      Mouth: Mucous membranes are moist.      Pharynx: No oropharyngeal exudate or posterior oropharyngeal erythema.     Eyes:      General: No scleral icterus.     Pupils: Pupils are equal, round, and reactive to light.     Pulmonary:      Effort: Pulmonary effort is normal. No respiratory distress.   Abdominal:      General: Abdomen is flat. Bowel sounds are normal. There is no distension.      Palpations: Abdomen is soft.      Tenderness: There is no abdominal tenderness.     Musculoskeletal:         General: No swelling or deformity.      Right lower leg: No edema.      Left lower leg: No edema.     Skin:     General: Skin is warm and dry.      Coloration: Skin is not jaundiced.      Findings: No erythema or rash.     Psychiatric:         Mood and Affect: Mood normal.         Judgment: Judgment normal.               Labs  Lab Results   Component Value Date    WBC 14.2 (H) 02/15/2023    HGB 14.5 02/15/2023    HCT 41.5 02/15/2023    MCV 84 02/15/2023     02/15/2023     Lab Results   Component Value Date    GLUCOSE 94 07/23/2025    CALCIUM 9.2 07/23/2025     (L) 07/23/2025    K 4.1 07/23/2025    CO2 28 07/23/2025    CL 99 07/23/2025    BUN 9 07/23/2025    CREATININE 0.66 07/23/2025     Lab Results   Component Value Date    ALT 26 02/15/2023    AST 32 02/15/2023    ALKPHOS 43 02/15/2023    BILITOT 0.5 02/15/2023     Lab Results   Component Value Date    INR 1.1 02/15/2023     Imaging:   CT A/P 9/2024 (abdominal pain):   IMPRESSION:  No acute abnormality of  the abdomen or pelvis. An explanation for the  patient's symptoms is not obvious.    CT A/P 1/2025 (abdominal pain):   IMPRESSION:   NO EVIDENCE OF MASS, ADENOPATHY, FREE FLUID OR OTHER ACUTE ABNORMALITY.     Procedures:   None to review      Assessment/Plan   Malik Francis is a 45 y.o. male who presents to GI clinic for abdominal GERD. PMH significant for HTN/DLD, preDM, GERD, R inguinal hernia. GERD symptoms have been going on for several years and has never bene fully evaluated. Has taken PRN prilosec in the past without much improvement, cannot quantify how often. Has not taken any pantoprazole that he was prescribed. Has had long-standing hx of dysphagia mostly to solids, otherwise does not have any true red flag symptoms.     PLAN:   -Discussed LSM and foods to avoid to minimize symptoms   -Discussed avoiding NSAIDs altogether  -Will trial pantoprazole 40mg daily for at least 1 month before re-assessing symptoms.  -Will arrange for EGD as he is having ongoing dysphagia    #CRC screening  -Due for first screening colonoscopy. Ordered today    Stephanie Matthew MD  Gastroenterology Fellow, PGY-4                       [1] No family history on file.  [2] No Known Allergies

## 2025-07-31 NOTE — PATIENT INSTRUCTIONS
Thank you for seeing us in GI clinic.     Your symptoms are most likely consistent with GERD, which sounds like has been going on for a long time. I recommend trying pantoprazole daily and trying some of the lifestyle changes we talked about - avoiding alcohol, caffeine, red meat, acidic fruits, red sauce; avoid eating within 2 hours before sleeping. Please continue the pantoprazole for 1 month to see resolution in your symptoms. We should also do an upper endoscopy (EGD) since you are having the sensation of food getting 'stuck.'    You are due for a colonoscopy to screen for colon cancer. This can be scheduled at the same time as the EGD.     Please return to clinic in about 3 months after your procedures are done.

## 2025-08-15 DIAGNOSIS — Z12.11 COLON CANCER SCREENING: Primary | ICD-10-CM

## 2025-08-15 RX ORDER — POLYETHYLENE GLYCOL 3350, SODIUM CHLORIDE, SODIUM BICARBONATE, POTASSIUM CHLORIDE 420; 11.2; 5.72; 1.48 G/4L; G/4L; G/4L; G/4L
4000 POWDER, FOR SOLUTION ORAL ONCE
Qty: 4000 ML | Refills: 0 | Status: SHIPPED | OUTPATIENT
Start: 2025-08-15 | End: 2025-08-15

## 2025-10-27 ENCOUNTER — APPOINTMENT (OUTPATIENT)
Dept: PRIMARY CARE | Facility: CLINIC | Age: 45
End: 2025-10-27
Payer: COMMERCIAL